# Patient Record
Sex: FEMALE | Race: NATIVE HAWAIIAN OR OTHER PACIFIC ISLANDER | URBAN - METROPOLITAN AREA
[De-identification: names, ages, dates, MRNs, and addresses within clinical notes are randomized per-mention and may not be internally consistent; named-entity substitution may affect disease eponyms.]

---

## 2017-04-28 ENCOUNTER — OFFICE VISIT (OUTPATIENT)
Dept: ENDOCRINOLOGY | Age: 47
End: 2017-04-28

## 2017-04-28 VITALS
HEIGHT: 67 IN | WEIGHT: 199.5 LBS | BODY MASS INDEX: 31.31 KG/M2 | HEART RATE: 76 BPM | SYSTOLIC BLOOD PRESSURE: 127 MMHG | RESPIRATION RATE: 16 BRPM | DIASTOLIC BLOOD PRESSURE: 62 MMHG | TEMPERATURE: 98.1 F

## 2017-04-28 DIAGNOSIS — R63.5 WEIGHT GAIN: Primary | ICD-10-CM

## 2017-04-28 RX ORDER — TEMAZEPAM 15 MG/1
15 CAPSULE ORAL
COMMUNITY

## 2017-04-28 RX ORDER — DULOXETIN HYDROCHLORIDE 30 MG/1
30 CAPSULE, DELAYED RELEASE ORAL DAILY
COMMUNITY

## 2017-04-28 RX ORDER — OMEPRAZOLE 20 MG/1
20 CAPSULE, DELAYED RELEASE ORAL
COMMUNITY

## 2017-04-28 NOTE — PROGRESS NOTES
[facility]               Sergio Romo MD        [address] [address] QZ:[phone] Fax [phone]       Patient Information  Date:4/30/2017  Name : Peace Gutierrez 55 y.o.     YOB: 1970         Referred by: No primary care provider on file. Chief Complaint   Patient presents with    New Patient     Thyroid       History of Present Illness: Peace Gutierrez is a 55 y.o. female here for evaluation of thyroid and weight gain. She reports weight gain in the last 5 months and has reportedly gained 8 lbs. She receives Steroid injections for DJD - every  4 months   Eats healthy and exercises 3 times a week   No depression now   No new meds     She has insomnia and no known ADITYA      Past Medical History:   Diagnosis Date    Acid reflux      Past Surgical History:   Procedure Laterality Date    HX BUNIONECTOMY      HX DILATION AND CURETTAGE      HX HYSTERECTOMY  2016    HX TUBAL LIGATION       Current Outpatient Prescriptions   Medication Sig    DULoxetine (CYMBALTA) 30 mg capsule Take 30 mg by mouth daily.  temazepam (RESTORIL) 15 mg capsule Take 15 mg by mouth nightly.  omeprazole (PRILOSEC) 20 mg capsule Take 20 mg by mouth daily as needed.  OTHER Take 1 Tab by mouth daily. Wt loss med - Relicore     No current facility-administered medications for this visit.       No Known Allergies      Review of Systems:  - Constitutional Symptoms: no fevers, chills, weight loss  - Eyes: no blurry vision no double vision  - Cardiovascular: no chest pain no palpitations  - Respiratory: no cough no shortness of breath  - Gastrointestinal: no dysphagia no abdominal pain  - Musculoskeletal: no joint pains  + weakness  - Integumentary: no rashes  - Neurological: no numbness, tingling, no headaches  - Psychiatric: no depression no anxiety  - Endocrine: no heat ,no cold intolerance, no polyuria or polydipsia    Physical Examination:  Blood pressure 127/62, pulse 76, temperature 98.1 °F (36.7 °C), temperature source Oral, resp. rate 16, height 5' 7\" (1.702 m), weight 199 lb 8 oz (90.5 kg). Body mass index is 31.25 kg/(m^2). - General: pleasant, no distress, good eye contact  - HEENT: no exopthalmos, no periorbital edema, no scleral/conjunctival injection, EOMI, no lid lag or stare  - Neck: supple, no thyromegaly, lymph nodes, or carotid bruits, no supraclavicular or dorsocervical fat pads  - Cardiovascular: regular, normal rate, normal S1 and S2, no murmurs  - Respiratory: clear to auscultation bilaterally  - Gastrointestinal: soft, nontender, nondistended,BS +  - Musculoskeletal: no proximal muscle weakness in upper or lower extremities  - Integumentary: no acanthosis nigricans, no abnormal abdominal striae, no rashes, no edema  - Neurological: Alert and oriented, no tremor  - Psychiatric: normal mood and affect    Data Reviewed:     Assessment/Plan:     1. Weight gain      At this point, there is no clear reason why she has gained weight and weight gain is minimal.  Genetics and lifestyle play a very important role. There is no signs of excess cortisol. She is biochemically euthyroid, metabolism decreases with aging. Decreasing calories with increasing weight will help sometimes. Check salivary cortisol. Good sleep hygeine        Thank you for allowing me to participate in the care of this patient. Cortez Osborne MD      Patient Instructions   Instructions for salivary cortisol test    1. Do not brush teeth before collecting specimen. 2. Do not eat or drink for 15 minutes prior to specimen collection. Night 1- Swab inside of cheeks between 11p-midnight for four (4) minutes, label with name, date of birth, collection date and collection time. Place swab in the freezer.     Night 5- Swab inside of cheeks between 11p-midnight for four (4) minutes, label with name, date of birth, collection date and collection time.  Place swab in the freezer.      Swabs are to remain frozen until you can drop them off to [facility] with orders or our lab in the office. Follow-up Disposition:  Return in about 3 months (around 7/28/2017).         Patient verbalized understanding

## 2017-04-28 NOTE — MR AVS SNAPSHOT
Visit Information Date & Time Provider Department Dept. Phone Encounter #  
 4/28/2017  9:00 AM Miracle Collins MD [facility] [phone] [id number] Follow-up Instructions Return in about 3 months (around 7/28/2017). Upcoming Health Maintenance Date Due DTaP/Tdap/Td series (1 - Tdap) 10/6/1991 PAP AKA CERVICAL CYTOLOGY 10/6/1991 INFLUENZA AGE 9 TO ADULT 8/1/2016 Allergies as of 4/28/2017  Review Complete On: 4/28/2017 By: Miracle Collins MD  
 No Known Allergies Current Immunizations  Never Reviewed No immunizations on file. Not reviewed this visit Vitals BP Pulse Temp Resp Height(growth percentile) Weight(growth percentile) 127/62 (BP 1 Location: Right arm, BP Patient Position: Sitting) 76 98.1 °F (36.7 °C) (Oral) 16 5' 7\" (1.702 m) 199 lb 8 oz (90.5 kg) BMI Smoking Status 31.25 kg/m2 Never Smoker BMI and BSA Data Body Mass Index Body Surface Area  
 31.25 kg/m 2 2.07 m 2 Preferred Pharmacy Pharmacy Name Phone [facility] [address] [phone] Your Updated Medication List  
  
   
This list is accurate as of: 4/28/17 10:12 AM.  Always use your most recent med list.  
  
  
  
  
 DULoxetine 30 mg capsule Commonly known as:  CYMBALTA Take 30 mg by mouth daily. OTHER Take 1 Tab by mouth daily. Wt loss med - Relicore PriLOSEC 20 mg capsule Generic drug:  omeprazole Take 20 mg by mouth daily as needed. temazepam 15 mg capsule Commonly known as:  RESTORIL Take 15 mg by mouth nightly. Follow-up Instructions Return in about 3 months (around 7/28/2017). Patient Instructions Instructions for salivary cortisol test 
 
1. Do not brush teeth before collecting specimen. 2. Do not eat or drink for 15 minutes prior to specimen collection. Night 1- Swab inside of cheeks between 11p-midnight for four (4) minutes, label with name, date of birth, collection date and collection time. Place swab in the freezer. 
  
Night 5- Swab inside of cheeks between 11p-midnight for four (4) minutes, label with name, date of birth, collection date and collection time. Place swab in the freezer.  
  
Swabs are to remain frozen until you can drop them off to [facility] with orders or our lab in the office. Introducing [facility]! Selene Garza introduces [employer] patient portal. Now you can access parts of your medical record, email your doctor's office, and request medication refills online. 1. In your internet browser, go to [email or web address] 2. Click on the First Time User? Click Here link in the Sign In box. You will see the New Member Sign Up page. 3. Enter your [employer] Access Code exactly as it appears below. You will not need to use this code after youve completed the sign-up process. If you do not sign up before the expiration date, you must request a new code. · [employer] Access Code: [facility] Expires: 7/27/2017 10:12 AM 
 
4. Enter the last four digits of your Social Security Number (xxxx) and Date of Birth (mm/dd/yyyy) as indicated and click Submit. You will be taken to the next sign-up page. 5. Create a [employer] ID. This will be your [employer] login ID and cannot be changed, so think of one that is secure and easy to remember. 6. Create a [employer] password. You can change your password at any time. 7. Enter your Password Reset Question and Answer. This can be used at a later time if you forget your password. 8. Enter your e-mail address. You will receive e-mail notification when new information is available in [address] 9. Click Sign Up. You can now view and download portions of your medical record. 10. Click the Download Summary menu link to download a portable copy of your medical information. If you have questions, please visit the Frequently Asked Questions section of the [employer] website. Remember, [id number] is NOT to be used for urgent needs. For medical emergencies, dial 911. Now available from your iPhone and Android! Please provide this summary of care documentation to your next provider. If you have any questions after today's visit, please call [phone].

## 2017-04-28 NOTE — PATIENT INSTRUCTIONS
Instructions for salivary cortisol test    1. Do not brush teeth before collecting specimen. 2. Do not eat or drink for 15 minutes prior to specimen collection. Night 1- Swab inside of cheeks between 11p-midnight for four (4) minutes, label with name, date of birth, collection date and collection time. Place swab in the freezer.     Night 5- Swab inside of cheeks between 11p-midnight for four (4) minutes, label with name, date of birth, collection date and collection time. Place swab in the freezer.      Swabs are to remain frozen until you can drop them off to [facility] with orders or our lab in the office.

## 2017-10-26 ENCOUNTER — OP HISTORICAL/CONVERTED ENCOUNTER (OUTPATIENT)
Dept: OTHER | Age: 47
End: 2017-10-26